# Patient Record
(demographics unavailable — no encounter records)

---

## 2024-11-03 NOTE — HISTORY OF PRESENT ILLNESS
[FreeTextEntry1] : Patient is a 69 yo M with CKD3, HTN, DM, who presents to followup  Current meds with him - atorvastatin 10mg, telmisartan/hctz 80-25mg, metoprolol 50, amlodipine 10, kerendia 20, jardiance 10mg   Reviewed labs in detail, hyponatremia worsened, described water intake down but food is stable  Ophthalmology - Renny Alicjaer [phone] fax [phone]; s/p cataract surgery bilaterally with recovery, eyes red Does admit has alcohol about 4 beers a day, resistant to stopping "what else can i drink" POCUS consistent with volume down, stable kidney exam with left small kidney stone.

## 2024-11-03 NOTE — HISTORY OF PRESENT ILLNESS
[FreeTextEntry1] : Patient is a 67 yo M with CKD3, HTN, DM, who presents to followup  Current meds with him - atorvastatin 10mg, telmisartan/hctz 80-25mg, metoprolol 50, amlodipine 10, kerendia 20, jardiance 10mg   Reviewed labs in detail, hyponatremia worsened, described water intake down but food is stable  Ophthalmology - Renny Alicjaer [phone] fax [phone]; s/p cataract surgery bilaterally with recovery, eyes red Does admit has alcohol about 4 beers a day, resistant to stopping "what else can i drink" POCUS consistent with volume down, stable kidney exam with left small kidney stone.

## 2024-11-03 NOTE — PHYSICAL EXAM
[General Appearance - Alert] : alert [General Appearance - In No Acute Distress] : in no acute distress [Outer Ear] : the ears and nose were normal in appearance [Oropharynx] : the oropharynx was normal [Neck Appearance] : the appearance of the neck was normal [Neck Cervical Mass (___cm)] : no neck mass was observed [Jugular Venous Distention Increased] : there was no jugular-venous distention [Respiration, Rhythm And Depth] : normal respiratory rhythm and effort [Exaggerated Use Of Accessory Muscles For Inspiration] : no accessory muscle use [Auscultation Breath Sounds / Voice Sounds] : lungs were clear to auscultation bilaterally [Heart Rate And Rhythm] : heart rate was normal and rhythm regular [Heart Sounds] : normal S1 and S2 [Heart Sounds Gallop] : no gallops [Murmurs] : no murmurs [Heart Sounds Pericardial Friction Rub] : no pericardial rub [Edema] : there was no peripheral edema [Veins - Varicosity Changes] : there were no varicosital changes [Bowel Sounds] : normal bowel sounds [Abdomen Soft] : soft [Abdomen Tenderness] : non-tender [Abdomen Mass (___ Cm)] : no abdominal mass palpated [No CVA Tenderness] : no ~M costovertebral angle tenderness [No Spinal Tenderness] : no spinal tenderness [Abnormal Walk] : normal gait [Involuntary Movements] : no involuntary movements were seen [Skin Color & Pigmentation] : normal skin color and pigmentation [Skin Turgor] : normal skin turgor [] : no rash [Cranial Nerves] : cranial nerves 2-12 were intact [No Focal Deficits] : no focal deficits [Affect] : the affect was normal [Mood] : the mood was normal [FreeTextEntry1] : s/p cataract surgery still red eyes

## 2024-11-03 NOTE — HISTORY OF PRESENT ILLNESS
[FreeTextEntry1] : Patient is a 67 yo M with CKD3, HTN, DM, who presents to followup  Current meds with him - atorvastatin 10mg, telmisartan/hctz 80-25mg, metoprolol 50, amlodipine 10, kerendia 20, jardiance 10mg   Reviewed labs in detail, hyponatremia worsened, described water intake down but food is stable  Ophthalmology - Renny Alicjaer [phone] fax [phone]; s/p cataract surgery bilaterally with recovery, eyes red Does admit has alcohol about 4 beers a day, resistant to stopping "what else can i drink" POCUS consistent with volume down, stable kidney exam with left small kidney stone.

## 2024-11-03 NOTE — ASSESSMENT
[FreeTextEntry1] : A Focused Ultrasound of the: [   ] Limited Chest [  X ] Limited Echo [ X  ] Limited Retroperitoneal [   ] Limited Abdominal [   ] Limited Vascular   Indication: Volume status, CKD, hyponatremia   Acquisition: PLAX, PSAX, Apical, subxiphoid views obtained. Bilateral long and short views of kidneys obtained The study was technically limited for the following reasons: None Findings: Bilateral increased kidney size with small stone on left, no hydronephrosis bilaterally. Heart LVH severe but normal EF, IVC flat   Interpretation: No progression of CKD but signs of chronic hypovolemia, small stone on left and flat ivc   The following follow-up studies are indicated: None   Archival System: Images were saved to [employer]

## 2024-11-03 NOTE — ASSESSMENT
[FreeTextEntry1] : A Focused Ultrasound of the: [   ] Limited Chest [  X ] Limited Echo [ X  ] Limited Retroperitoneal [   ] Limited Abdominal [   ] Limited Vascular   Indication: Volume status, CKD, hyponatremia   Acquisition: PLAX, PSAX, Apical, subxiphoid views obtained. Bilateral long and short views of kidneys obtained The study was technically limited for the following reasons: None Findings: Bilateral increased kidney size with small stone on left, no hydronephrosis bilaterally. Heart LVH severe but normal EF, IVC flat   Interpretation: No progression of CKD but signs of chronic hypovolemia, small stone on left and flat ivc   The following follow-up studies are indicated: None   Archival System: Images were saved to [employer]

## 2024-11-03 NOTE — ASSESSMENT
[FreeTextEntry1] : A Focused Ultrasound of the: [   ] Limited Chest [  X ] Limited Echo [ X  ] Limited Retroperitoneal [   ] Limited Abdominal [   ] Limited Vascular   Indication: Volume status, CKD, hyponatremia   Acquisition: PLAX, PSAX, Apical, subxiphoid views obtained. Bilateral long and short views of kidneys obtained The study was technically limited for the following reasons: None Findings: Bilateral increased kidney size with small stone on left, no hydronephrosis bilaterally. Heart LVH severe but normal EF, IVC flat   Interpretation: No progression of CKD but signs of chronic hypovolemia, small stone on left and flat ivc   The following follow-up studies are indicated: None   Archival System: Images were saved to [employer]

## 2025-01-15 NOTE — ASSESSMENT
[address] [facility]. 
[address] [facility] [address] [place], [address] 
[phone] GI CONSULTATION NOTE 
 
 
NAME:  Nanette Cooley III  
:   1951 MRN:   [id number] Consult Date: 2020 Chief Complaint: Anemia History of Present Illness:  Patient is a 71 y.o. who is seen in consultation at the request of Dr. Fadi Kumar for the above problem. Patient was admitted here on 20 with sepsis from osteomyelitis of the right foot. He had debridement yesterday and is currently getting antibiotics: Cefepime, Flagyl and Daptomycin. The operative note reports less than 100 ml of blood loss during surgery, but the nurse reports there was a lot of post-op bleeding with blood-soaked bandages needing to be changed frequently for several hours. Upon admission, his Hgb was 7.9, dropped as low as 6.5 yesterday and after transfusions improved to 7.8, but this morning is 6.9 again. He is currently receiving another unit of PRBCs. He states that he has been anemic for about a year or so, and reports having had an extensive GI evaluation at [facility] earlier this year (see summary below). He denies any recent or current nausea, vomiting, hematemesis, CGE, melena, hematochezia, diarrhea. He is feeling constipated and has not had a BM since admission, where he had been having a BM daily previously. He is getting pain medications at this time, and he states he does not want any laxatives as he fears incontinence since he is unable to get out of bed right now. We were able to retrieve [facility] records from earlier this year: - EGD 20 - erythema in the pre-pyloric stomach 
- Colonoscopy 20 - pan-diverticulosis, otherwise normal 
- Virtual colonoscopy 20 - normal GI  
- EGD 20 - clotted blood in the fundus, requiring copious lavage to dislodge the clot and net to remove it 
- Small bowel enteroscopy 20 - normal, no bleeding [FreeTextEntry1] : Patient is a 67 yo M with CKD3, HTN, DM, Hyponatremia, who presents to followup  Hyponatremia - likely 2/2 tea/toast and hypovolemia, will check hyponatremia labs today as below, enocuraged to increase protein intake for now, likely to need urena given acute on chronic state  CKD3 - likely 2/2 prior uncontrolled HTN and DM, now better controlled. HTN and DM disease management paramount, improved thus far, monitoring closely  HTN - Overtreated today, will stop amloipidne and hctz   DM - at goal  Acidosis - below goal, recheck labs today  MBD-CKD - Phos, PTH at goal recheck phos today  Anemia of CKD - Labs today.   RTC in 2 months to recheck BP - Capsule endoscopy 7/21/20 - BRB in the stomach with small erosions 
- EGD 7/23/20 - multiple non-bleeding angioectasias, treated with APCs PMH: 
Past Medical History:  
Diagnosis Date  Arthritis, Degenerative,  Knee 4/4/2011  Carcinoma, Prostate 4/4/2011  Degenerative disc disease 4/4/2011  Depression/ Anxiety 4/4/2011  Diabetes Mellitrus ( non-insulin dependent ) Type 2 4/4/2011  
 Heart failure ([address]  HEMATOCHEZIA 4/4/2011  Hypertrension 4/4/2011  Hypertriglyceridemia 4/4/2011  Ill-defined condition   
 lymphadema  Insomnia 4/4/2011  Laryngitis 4/4/2011  Mild Aortic insufficiency 4/4/2011  Mild Concentric LVH (left ventricular hypertrophy) 4/4/2011  Neuropathy 4/4/2011  Osteoarthritis 4/4/2011  Rotator Cuff Tendonitis 4/4/2011 PSH: 
Past Surgical History:  
Procedure Laterality Date  CARDIAC SURG PROCEDURE UNLIST    
 cardiac cath  COLONOSCOPY N/A 4/28/2017 COLONOSCOPY performed by Sue Masters MD at [facility] ENDOSCOPY  
 HX ORTHOPAEDIC    
 left hip pinning  HX OTHER SURGICAL    
 left little toe amputation  HX UROLOGICAL Allergies: 
No Known Allergies Home Medications: 
Prior to Admission Medications Prescriptions Last Dose Informant Patient Reported? Taking? Calcium Carbonate-Vit D3-Min 600 mg calcium- 400 unit tab 11/30/2020 at Unknown time  Yes Yes Sig: Take 1 Tab by mouth two (2) times a day. Insulin Needles, Disposable, (NOVOFINE 32) 32 gauge x 1/4\" ndle 11/30/2020 at Unknown time  No Yes Sig: Sig:use 4 times a day as needed Insulin Syringe-Needle U-100 (BD Insulin Syringe Ultra-Fine) 1 mL 31 gauge x 5/16 syrg 11/30/2020 at Unknown time  No Yes Sig: USE TO INJECT INSULIN FIVE TIMES A DAY NOVOLIN R REGULAR U-100 INSULN 100 unit/mL injection 11/30/2020 at Unknown time  No Yes Sig: INJECT 14 UNITS UNDER THE SKIN THREE TIMES A DAY WITH MEALS AS NEEDED  
 Thera-Tabs M 27 mg iron-400 mcg tab 11/30/2020 at Unknown time  No Yes Sig: TAKE ONE TABLET BY MOUTH DAILY  
acetaminophen (PAIN AND FEVER) 500 mg tablet 11/30/2020 at Unknown time  No Yes Sig: TAKE ONE TABLET BY MOUTH EVERY 6 HOURS AS NEEDED FOR PAIN  
amLODIPine (NORVASC) 10 mg tablet 11/30/2020 at Unknown time  No Yes Sig: TAKE ONE TABLET BY MOUTH DAILY  
ammonium lactate (AMLACTIN) 12 % topical cream 11/30/2020 at Unknown time  No Yes Sig: Apply  to affected area two (2) times a day. rub in to affected area well  
ammonium lactate (LAC-HYDRIN FIVE) 5 % lotion 11/30/2020 at Unknown time  No Yes Sig: Apply 1 Applicator to affected area daily. Apply daily bilateral lower legs  
ascorbic acid, vitamin C, (Vitamin C) 500 mg tablet 11/30/2020 at Unknown time  Yes Yes Sig: Take  by mouth. aspirin delayed-release 81 mg tablet 11/30/2020 at Unknown time  Yes Yes Sig: Take 81 mg by mouth daily. bicalutamide (CASODEX) 50 mg tablet 11/30/2020 at Unknown time  No Yes Sig: TAKE ONE TABLET BY MOUTH DAILY  
bisacodyl (DULCOLAX, BISACODYL,) 10 mg suppository 11/30/2020 at Unknown time  Yes Yes Sig: Insert 10 mg into rectum daily. diclofenac (VOLTAREN) 1 % gel 11/30/2020 at Unknown time  No Yes Sig: Apply  to affected area four (4) times daily. enzalutamide (XTANDI) 40 mg capsule 11/30/2020 at Unknown time  Yes Yes Sig: Take 160 mg by mouth daily. ferrous sulfate 325 mg (65 mg iron) tablet 11/30/2020 at Unknown time  No Yes Sig: TAKE ONE TABLET BY MOUTH DAILY BEFORE BREAKFAST  
fluticasone propionate (FLONASE) 50 mcg/actuation nasal spray 11/30/2020 at Unknown time  No Yes Sig: SPRAY TWO SPRAYS IN THE AFFECTED NOSTRIL DAILY  
gabapentin (NEURONTIN) 300 mg capsule 11/30/2020 at Unknown time  No Yes Sig: Take 1 Cap by mouth three (3) times daily. Max Daily Amount: 900 mg.  
hydrALAZINE (APRESOLINE) 100 mg tablet 11/30/2020 at Unknown time  No Yes Sig: Take 1 Tab by mouth three (3) times daily. hydrOXYzine HCL (ATARAX) 50 mg tablet 2020 at Unknown time  No Yes Sig: TAKE ONE TABLET BY MOUTH THREE TIMES A DAY AS NEEDED FOR ITCHING FOR UP TO 10 DAYS  
insulin aspart U-100 (NOVOLOG FLEXPEN U-100 INSULIN) 100 unit/mL (3 mL) inpn 2020 at Unknown time  Yes Yes Sig: by SubCUTAneous route. insulin glargine (LANTUS U-100 INSULIN) 100 unit/mL injection 2020 at Unknown time  No Yes Si Units by SubCUTAneous route daily. insulin regular (NOVOLIN R, HUMULIN R) 100 unit/mL injection 2020 at Unknown time  No Yes Si units sc tid  
ketoconazole (NIZORAL) 2 % shampoo 2020 at Unknown time  No Yes Sig: Sig:shampoo once a week  
ketoconazole (NIZORAL) 2 % topical cream 2020 at Unknown time  No Yes Sig: Apply  to affected area two (2) times a day. pantoprazole (PROTONIX) 40 mg tablet 2020 at Unknown time  No Yes Sig: TAKE ONE TABLET BY MOUTH TWICE DAILY ONCE IN THE MORNING AND ONCE IN THE EVENING  
pregabalin (LYRICA) 150 mg capsule 2020 at Unknown time  No Yes Sig: TAKE ONE CAPSULE BY MOUTH TWICE A DAY  
tamsulosin (FLOMAX) 0.4 mg capsule 2020 at Unknown time  No Yes Sig: TAKE ONE CAPSULE BY MOUTH DAILY  
torsemide (DEMADEX) 20 mg tablet 2020 at Unknown time  No Yes Sig: TAKE FOUR TABLETS BY MOUTH TWICE A DAY  
triamcinolone acetonide (KENALOG) 0.1 % ointment 2020 at Unknown time  No Yes Sig: APPLY A THIN LAYER TO AFFECTED AREA(S) TWO TIMES A DAY **DO NOT EXCEED 2.66 GRAMS PER DAY** Facility-Administered Medications: None Hospital Medications: 
Current Facility-Administered Medications Medication Dose Route Frequency  0.9% sodium chloride infusion 250 mL  250 mL IntraVENous PRN  
 cefepime (MAXIPIME) 2 g in 0.9% sodium chloride (MBP/ADV) 100 mL MBP  2 g IntraVENous Q24H  
 diclofenac (VOLTAREN) 1 % topical gel 2 g  2 g Topical QID  0.9% sodium chloride infusion 250 mL  250 mL IntraVENous PRN  
 0.9% sodium chloride infusion 250 mL  250 mL IntraVENous PRN  
 oxyCODONE-acetaminophen (PERCOCET) 5-325 mg per tablet 1 Tab  1 Tab Oral Q6H PRN  
 acetaminophen (TYLENOL) tablet 500 mg  500 mg Oral Q4H PRN  
 0.9% sodium chloride infusion  75 mL/hr IntraVENous CONTINUOUS  
 0.9% sodium chloride infusion 250 mL  250 mL IntraVENous PRN  
 metroNIDAZOLE (FLAGYL) IVPB premix 500 mg  500 mg IntraVENous Q12H  DAPTOmycin (CUBICIN) 750 mg in 0.9% sodium chloride 50 mL IVPB RF formulation  750 mg IntraVENous Q48H  
 amLODIPine (NORVASC) tablet 5 mg  5 mg Oral DAILY  aspirin delayed-release tablet 81 mg  81 mg Oral DAILY  bicalutamide (CASODEX) tablet 50 mg  50 mg Oral DAILY  enzalutamide [facility]) chemo capsule 160 mg (patient supplied) (Patient Supplied)  160 mg Oral DAILY AFTER BREAKFAST  gabapentin (NEURONTIN) capsule 100 mg  100 mg Oral TID  hydrALAZINE (APRESOLINE) tablet 100 mg  100 mg Oral TID  tamsulosin (FLOMAX) capsule 0.4 mg  0.4 mg Oral DAILY  nicotine (NICODERM CQ) 14 mg/24 hr patch 1 Patch  1 Patch TransDERmal DAILY  insulin lispro (HUMALOG) injection   SubCUTAneous AC&HS  
 glucose chewable tablet 16 g  4 Tab Oral PRN  
 dextrose (D50W) injection syrg 12.5-25 g  12.5-25 g IntraVENous PRN  
 glucagon (GLUCAGEN) injection 1 mg  1 mg IntraMUSCular PRN  
 [Held by provider] heparin (porcine) injection 5,000 Units  5,000 Units SubCUTAneous Q8H  
 insulin lispro (HUMALOG) injection 20 Units  20 Units SubCUTAneous TIDAC  insulin glargine (LANTUS) injection 50 Units  50 Units SubCUTAneous QHS Social History: 
Social History Tobacco Use  Smoking status: Current Every Day Smoker Packs/day: 1.00 Last attempt to quit: 2019 Years since quittin.1  Smokeless tobacco: Never Used Substance Use Topics  Alcohol use: Not Currently Alcohol/week: 11.7 standard drinks Types: 7 Cans of beer, 7 Shots of liquor per week Family History: 
Family History Family history unknown: Yes Review of Systems: 
 
Constitutional: negative fever, negative chills, negative weight loss Eyes:   negative visual changes ENT:   negative sore throat, tongue or lip swelling Respiratory:  negative cough, negative dyspnea Cards:  negative for chest pain, palpitations, lower extremity edema GI:   See HPI 
:  negative for frequency, dysuria Integument:  negative for rash and pruritus Heme:  negative for easy bruising and gum/nose bleeding Musculoskel: Right foot pain post debridement for osteomyelitis Neuro: negative for headaches, dizziness, vertigo Psych:  negative for feelings of anxiety, depression Objective:  
 
Patient Vitals for the past 8 hrs: 
 BP Temp Pulse Resp SpO2 Weight 12/31/20 1035 (!) 155/62 98.6 °F (37 °C) 81 17 95 %   
12/31/20 1005 (!) 154/63 98.8 °F (37.1 °C) 85 18 94 %   
12/31/20 0950 (!) 151/65 98.8 °F (37.1 °C) 84 17 94 %   
12/31/20 0935 (!) 140/63 98.7 °F (37.1 °C) 86 19 93 %   
12/31/20 0906      114.1 kg (251 lb 8.7 oz) 12/31 0701 - 12/31 1900 In: 480 [P.O.:480] Out: -  
12/29 1901 - 12/31 0700 In: 1955.1 [P.O.:720; I.V.:150] Out: 1405 [[id number]] PHYSICAL EXAM: 
General appearance: cooperative, no distress, appears stated age Skin: Extremities and face reveal no rashes, jaundice or pallor HEENT: Sclerae anicteric. Extra-occular muscles are intact. Cardiovascular: Regular rate and rhythm. No murmurs, gallops, or rubs. Respiratory: Clear breath sounds with no wheezes, rales, or rhonchi. GI: Abdomen nondistended, soft, and nontender. Normal active bowel sounds. No enlargement of the liver or spleen. No masses palpable. Rectal: Deferred Musculoskeletal: No edema of the lower legs. Bandaged right foot Neurological: Gross memory appears intact. Patient is alert and oriented. Psychiatric: Mood appears appropriate with good judgement. No anxiety or agitation. Data Review Recent Labs 12/31/20 
9345 12/30/20 
2153 WBC 12.7* 13.9* HGB 6.9* 7.8* HCT 20.9* 23.4*  
 322 Recent Labs 12/31/20 
7536 12/30/20 
0117 12/29/20 
0404 * 130* 129*  
K 4.4 3.9 3.9  100 97 CO2 20* 21 22 BUN 59* 63* 60* CREA 3.75* 4.37* 3.84* * 190* 203* PHOS  --  3.9 3.5 CA 7.7* 7.9* 8.5 Recent Labs  
  12/29/20 
0404 12/28/20 
1541 AP  --  162* TP  --  8.3* ALB 1.6* 1.8*  
GLOB  --  6.5* No results for input(s): INR, PTP, APTT, INREXT in the last 72 hours. Imaging studies reviewed Assessment / Plan :  
 
Anemia / GIB 
- Extensive w/u at [address] [place] [address] with evidence of gastric angioectasias treated with APC 
- Plan for EGD 1/4/21 with Dr. Queen Bolds Osteomyelitis right foot Sepsis - Ortho and ID following KATHI on CKD 
- Nephrology following COVID-negative 12/29/20 Previous GI w/u at [facility]: 
- EGD 6/1/20 - erythema in the pre-pyloric stomach 
- Colonoscopy 6/1/20 - pan-diverticulosis, otherwise normal 
- Virtual colonoscopy 6/2/20 - normal GI  
- EGD 6/22/20 - clotted blood in the fundus, requiring copious lavage to dislodge the clot and net to remove it 
- Small bowel enteroscopy 7/20/20 - normal, no bleeding - Capsule endoscopy 7/21/20 - BRB in the stomach with small erosions 
- EGD 7/23/20 - multiple non-bleeding angioectasias, treated with APCs Patient Active Hospital Problem List:  
Principal Problem: 
  Osteomyelitis ([facility] [address] (12/28/2020)

## 2025-01-15 NOTE — END OF VISIT
Yi Mari is a 43 year old female presents for a preop exam for hysterectomy, bilateral salpingectomy for heavy irregular bleeding status post negative workup.  Social History     Tobacco Use   • Smoking status: Former Smoker     Packs/day: 0.50     Years: 23.00     Pack years: 11.50     Last attempt to quit: 2010     Years since quittin.6   • Smokeless tobacco: Never Used   Substance Use Topics   • Alcohol use: No     Alcohol/week: 0.0 standard drinks   • Drug use: No     Types: Marijuana     Comment: once in 2015     Social History     Tobacco Use   Smoking Status Former Smoker   • Packs/day: 0.50   • Years: 23.00   • Pack years: 11.50   • Last attempt to quit: 2010   • Years since quittin.6   Smokeless Tobacco Never Used     Social History     Substance and Sexual Activity   Alcohol Use No   • Alcohol/week: 0.0 standard drinks     Past Medical History:   Diagnosis Date   • Abnormal uterine bleeding 2018 wgm--for the last 1-2 years.  Normally every 4w or so, always been normal, then started getting heavy. Lasted 5 d.  For the last 1-2 years.  It has been worse, bleeding a lot, cramping a lot, lasting >5d.  If it stops, then it spots again 2d later. Lasts 5-7d for the last year.  Pain 6/10, doesn't take medicine for it.  Bleeds a lot more with midol, and tylenol.  H/o Essure  with Dr. Galindo   • Allergy    • Driver's esophagus 2018   • Bile salt-induced diarrhea 3/20/2019   • Carpal tunnel syndrome of right wrist 2017   • Compression of right radial nerve 2017   • Crohn's disease (CMS/HCC)    • DDD (degenerative disc disease), lumbar    • Deep peroneal nerve entrapment at the left dorsal foot 5/15/2017   • Duodenal diverticulum    • Erosive esophagitis     hx   • Essential (primary) hypertension    • Failed moderate sedation during procedure     woke up with sedation from egd   • Generalized anxiety disorder 2015   • GERD (gastroesophageal  [Time Spent: ___ minutes] : I have spent [unfilled] minutes of time on the encounter which excludes teaching and separately reported services. reflux disease)     w/ Driver's esophagus   • Hiatal hernia    • History of renal calculi 6/19/2019   • Hypersomnia 6/19/2019    Per sleep medicine; remote sleep study Possible narcolepsy   • Lateral epicondylitis of right elbow 5/11/2017   • Left lumbar radiculitis 4/19/2017   • Left superficial peroneal nerve entrapment syndrome at leg level 5/15/2017   • Lumbar facet arthropathy 6/19/2019    2017 MRI   • Median superior cluneal nerve entrapment syndrome 5/15/2017   • Migraine headache    • Morbid obesity with BMI of 40.0-44.9, adult (CMS/[facility])    • Muscle pain, fibromyalgia 6/2016    WPI = 10; SS score = 10   • Neurocardiogenic syncope 2016    Seen by EP   • Piriformis syndrome 5/15/2017   • Recurrent major depressive disorder, in partial remission (CMS/[facility]) 6/24/2013   • RLS (restless legs syndrome) 5/17/2016   • Sciatic pain 5/15/2017   • Sleep apnea     unsure of type    • Tonsil stone 11/7/2018   • Undiagnosed cardiac murmurs 4/2016     Past Surgical History:   Procedure Laterality Date   • Cholecystectomy     • Colonoscopy  05/26/2011   • Colonoscopy w/ biopsies  04/10/2019   • Ercp,sphincterotomy  10/10/2011    pancreatic ultrasound with sphincterotomy and stenting of the pancreatic duct   • Esophagogastroduodenoscopy  4/15/2015    DUE 2018   • Esophagogastroduodenoscopy  05/30/2018    +Barretts, repeat in 3 yrs   • Essure  2004 OR 05   • Hb lithotripsy  2005   • Paravertebral facet inj jnt lumbar sacral 1 Bilateral 8/2/2019    BLOCK, NERVE, SPINAL, LUMBOSACRAL, POSTERIOR RAMUS, MEDIAL BRANCH, BILATERAL, 1 LEVEL  BILATAERAL L3-4-5 Medial Branch block performed by Soheila Vergara MD at [facility] PA[facility]   • Zenda tooth extraction       Outpatient Medications Marked as Taking for the 10/7/19 encounter (Office Visit) with Ozzy Molina MD   Medication Sig Dispense Refill   • MISC NATURAL PRODUCTS PO Equate HA medication     • lisdexamfetamine (VYVANSE) 70 MG capsule Take 1 capsule by mouth every morning. 30 capsule  0   • medroxyPROGESTERone (PROVERA) 10 MG tablet Take 1 tablet by mouth daily. 7 tablet 0   • oxyCODONE-acetaminophen (PERCOCET) 5-325 MG per tablet Take 1-2 tablets by mouth every 6 hours as needed for Pain. 24 tablet 0   • cloNIDine (CATAPRES) 0.1 MG tablet Take 1 tablet by mouth 2 times daily. 180 tablet 1   • amitriptyline (ELAVIL) 25 MG tablet Take 1 tablet by mouth nightly. 30 tablet 6   • Vitamin D, Ergocalciferol, 60437 units capsule Take 1 capsule by mouth 1 day a week. 12 capsule 3   • cyclobenzaprine (FLEXERIL) 10 MG tablet Take 1 tablet by mouth 3 times daily as needed for Muscle spasms. 15 tablet 0   • MAGNESIUM OXIDE PO Take by mouth daily.     • rabeprazole (ACIPHEX) 20 MG tablet Take 1 tablet by mouth 2 times daily. 60 tablet 5   • ranitidine (ZANTAC) 300 MG tablet TAKE 1 TABLET BY MOUTH IN THE EVENING 30 tablet 11   • clonazePAM (KLONOPIN) 0.5 MG tablet Take 1 tablet by mouth 3 times daily as needed for Anxiety. 90 tablet 5   • loratadine (CLARITIN) 10 MG tablet Take 1 tablet PO daily 90 tablet 1   • fluticasone (FLONASE) 50 MCG/ACT nasal spray Spray 1 spray in each nostril 2 times daily. 1 Bottle 6   • cholecalciferol (VITAMIN D3) 1000 units tablet Take 1,000 Units by mouth daily.     • albuterol (PROAIR HFA) 108 (90 Base) MCG/ACT inhaler Inhale 2 puffs into the lungs every 4 hours as needed for Shortness of Breath or Wheezing. 9 g 5     ALLERGIES:   Allergen Reactions   • Vicodin [Hydrocodone-Acetaminophen] SHORTNESS OF BREATH   • Abilify Other (See Comments)     Feeling foggy/tired/mumbling words   • Adderall Other (See Comments)     Foggy mind   • Gabapentin HEADACHES   • Tramadol HEADACHES     Visit Vitals  BP (!) 146/110   Pulse 88   Ht 5' 2\" (1.575 m)   Wt 115.2 kg   LMP 08/30/2019 (Approximate)   BMI 46.46 kg/m²     GENERAL: Alert,Oriented,in no acute distress.  HEENT: Eyes: Pupils equal, round reactive to light, clear sclera. Ears,nose and throat normal. Pharynx normal.  NECK:  Normal on  inspection and palpation.  RESPIRATORY: Easy respirations, clear to auscultation bilaterally.  BACK: No CVAT.  CARDIOVASCULAR: tachycardia ** 130 bpm.  heart regular  rhythm, without any murmurs, gallops or clicks.  ABDOMEN: Soft,nontender.  No organomegaly, distention or herniation.  Bowel sounds normal.  EXTREMITIES:  No calf tenderness. No clubbing, cyanosis or edema. Range of motion normal.  SKIN: Normal.  NEUROLOGICAL: 2+DTR's.  HG strength 5/5  PSYCH:  Normal mood and normal affect.  Pelvic exam deferred to the OR    ROS:  Constitutional: Negative for fever and chills.   Skin: Negative for rash.   HEENT: Negative for eye drainage, ear pain, sore throat.  Respiratory: Negative cough, wheezing or shortness of breath.    Cardiovascular: Negative for chest pain or chest pressure.   Gastrointestinal: negative GI  Genitourinary: negative  Extremities:  Negative for joint swelling or joint pain.  Endocrine: Negative for heat or cold intolerance.  Psych: Negative for change in mood or mentation.    Patient Active Problem List    Diagnosis Date Noted   • Preop examination 10/07/2019     Priority: Low     Overview Note:     10/19 wgm-- informed consent obtained for robotic hysterectomy, bilateral salpingectomy. Hypertension, tachycardia 130 bpm     • Tachycardia 10/07/2019     Priority: Low     Overview Note:     10/19 wgm-- preop visit     • Menorrhagia 09/23/2019     Priority: Low     Overview Note:     9/19 wgm--for hysterectomy, bilateral salpingectomy  9/19 wgm-- hypertensive, over 34yo.  patient seen in follow-up from emergency department for another episode of heavy bleeding. Chart reviewed. Normal labs, normal ultrasound, normal endometrial biopsy. Patient has essure devices within. Therefore no D&C. Patient declines endometrial ablation, and I agree including due to the Essure devices in place. Pt declines Mirena or progesterone only attempt at controlling period.  Plan definitive treatment with hysterectomy.  Short-term attempt at bleeding control with progesterone 10 mg p.o. daily for 7 days. Schedule hysterectomy.  10/19 wgm-- preoperative appointment today. Chart reviewed. Informed consent obtained. Robotic hysterectomy bilateral salpingectomy. Tachycardia, to see presurgical optimization clinic versus primary care provider. 130bpm     • Carpal tunnel syndrome of right wrist 07/10/2019     Priority: Low   • Idiopathic peripheral neuropathy 07/10/2019     Priority: Low   • Hypersomnia 06/19/2019     Priority: Low     Overview Note:     Per sleep medicine; remote sleep study  Possible narcolepsy     • History of renal calculi 06/19/2019     Priority: Low   • Lumbar facet arthropathy 06/19/2019     Priority: Low     Overview Note:     2017 MRI     • Urge incontinence of urine 12/17/2018     Priority: Low     Overview Note:     12/18 wgm--pt describes being dry, even with cough/sneeze/exercise.  But short \"warning time\" between urge to go to bathroom and beginning of urination.  Trial of medication desired, over urodynamic testing.     • Lumbar degenerative disc disease      Priority: Low     Overview Note:     2017 MRI: Relatively mild multilevel degenerative change, most prominent at L4-L5. Multi level facet arthropathy     • Essential (primary) hypertension      Priority: Low     Overview Note:     Patient is on unopposed Clonidine 0.2 twice daily.  This is unusual.  Will likely transition using Atenolol and possibly to a CCB or ARB.     • IFG (impaired fasting glucose) 08/18/2017     Priority: Low     Overview Note:     A1C 5.7%     • Medication management 08/18/2017     Priority: Low   • Dysmenorrhea 07/19/2017     Priority: Low     Overview Note:     12/18 wgm--see abnormal bleeding notes.       • Generalized OA 12/23/2016     Priority: Low   • History of syncope 10/25/2016     Priority: Low     Overview Note:     Improves with hydration; holter unremarkable; EP evaluation     • Muscle pain, fibromyalgia 06/01/2016      Priority: Low     Overview Note:     WPI = 10; SS score = 10     • RLS (restless legs syndrome) 05/17/2016     Priority: Low   • Vitamin D deficiency 03/15/2016     Priority: Low   • Morbid obesity with BMI of 40.0-44.9, adult (CMS/[facility]) 02/02/2016     Priority: Low   • Environmental allergies 09/23/2015     Priority: Low   • ADHD (attention deficit hyperactivity disorder), inattentive type 08/06/2015     Priority: Low     Overview Note:     + Neuropsychology testing 2013.  She is currently taking Vyvanse 70 mg.     • Chronic tension-type headache, intractable 08/06/2015     Priority: Low     Overview Note:     Neurology  MRI brain 2015 unremarkable, except sinusitis  MRI brain 2011 unremarkable     • Generalized anxiety disorder 01/29/2015     Priority: Low     Overview Note:     Currently taking only Clonapin.  Cymbalta and or Amitriptyline would be good choices to reduce need for benzodiazepines.     • Recurrent major depressive disorder, in partial remission (CMS/[facility]) 06/24/2013     Priority: Low     Overview Note:     Past medications: Wellbutrin, Effexor, Abilify, Citalopram, Lexapro  Referral to behavioral health 2015     • GERD (gastroesophageal reflux disease) 01/09/2013     Priority: Low       Menorrhagia with irregular cycle  (primary encounter diagnosis)  Comment:   Plan:     Preop examination  Comment:   Plan:     Tachycardia  Comment:  Pulse was 88 per nursing exam. Pulse was 130 bpm when I examined her.  Plan: Presurgical optimization clinic versus evaluation per Dr. Umana, patient's primary care provider.    INFORMED CONSENT--robotic-assisted total vaginal hysterectomy, bilateral salpingectomy, or irregular heavy bleeding status post negative workup. Patient desires definitive therapy.  The risks, benefits and alternatives of the procedure were discussed with the patient, including the risks of bleeding, infection, injury to bowel, bladder or other organs requiring further surgery, as well as  thromboembolic and anesthetic complications.  We also discussed the risk of regret of having the procedure done.  She understands and agrees with the plan.    See problem notes above for details

## 2025-01-15 NOTE — HISTORY OF PRESENT ILLNESS
[FreeTextEntry1] : Patient is a 67 yo M with CKD3, HTN, DM, who presents to followup  Current meds with him - atorvastatin 10mg, telmisartan/hctz 80-25mg, metoprolol 50, amlodipine 10, kerendia 20, jardiance 10mg, urena 15g per day   Ophthalmology - Renny Steen [phone] fax [phone]; s/p cataract surgery bilaterally with recovery, eyes red; reviewed recent assessment diabetic retinopathy still worsened from last year, next check in 6 months Does admit has alcohol about 4 beers a day, still resistant to stopping  BPs lower - will stop HCTZ and amlodipine

## 2025-03-14 NOTE — HISTORY OF PRESENT ILLNESS
[FreeTextEntry1] : Patient is a 69 yo M with CKD3, HTN, DM, who presents to followup  Current meds with him - atorvastatin 10mg, telmisartan 80, metoprolol 50 BID, amlodipine 10, kerendia 20, jardiance 10mg, urena 15g per day  off of hctz  Ophthalmology - Standish Enio [phone] fax [phone]; s/p cataract surgery bilaterally with recovery, eyes red; reviewed recent assessment diabetic retinopathy still worsened from last year, will need surgery it seems Does admit has alcohol about 4 beers a day, still resistant to stopping  Hydration - doing well, sticking with water.

## 2025-03-14 NOTE — ASSESSMENT
[FreeTextEntry1] : Patient is a 69 yo M with CKD3, HTN, DM, Hyponatremia, who presents to followup  Hyponatremia - likely 2/2 tea/toast and hypovolemia, exacerbated by hctz, now off for a couple of months still on urena will recheck labs and if stable may try to stop urena  CKD3 - likely 2/2 prior uncontrolled HTN and DM, now better controlled. HTN and DM disease management paramount, improved thus far, monitoring closely  HTN - At goal on just telmisartan  DM - at goal  Acidosis - below goal, recheck labs today  MBD-CKD - Phos, PTH at goal  Anemia of CKD - at goal hgb no deficiency in iron   RTC in 3-4 months to recheck BP and hyponatremia labs, will call to stop urena if stable

## 2025-03-14 NOTE — HISTORY OF PRESENT ILLNESS
[FreeTextEntry1] : Patient is a 67 yo M with CKD3, HTN, DM, who presents to followup  Current meds with him - atorvastatin 10mg, telmisartan 80, metoprolol 50 BID, amlodipine 10, kerendia 20, jardiance 10mg, urena 15g per day  off of hctz  Ophthalmology - [place] Enio [phone] fax [phone]; s/p cataract surgery bilaterally with recovery, eyes red; reviewed recent assessment diabetic retinopathy still worsened from last year, will need surgery it seems Does admit has alcohol about 4 beers a day, still resistant to stopping  Hydration - doing well, sticking with water.

## 2025-07-30 NOTE — HISTORY OF PRESENT ILLNESS
[FreeTextEntry1] : Patient is a 67 yo M with CKD3, HTN, DM, who presents to followup  Current meds with him - atorvastatin 10mg, telmisartan 80, metoprolol 50 BID, amlodipine 10, kerendia 20, jardiance 10mg  Ophthalmology - Renny Steen [phone] fax [phone]; s/p cataract surgery bilaterally with recovery, eyes red; reviewed recent assessment diabetic retinopathy still worsened from last year, will need surgery it seems Does admit has alcohol about 4 beers a day, still resistant to stopping  Hydration - doing well, sticking with water

## 2025-07-30 NOTE — HISTORY OF PRESENT ILLNESS
[FreeTextEntry1] : Patient is a 69 yo M with CKD3, HTN, DM, who presents to followup  Current meds with him - atorvastatin 10mg, telmisartan 80, metoprolol 50 BID, amlodipine 10, kerendia 20, jardiance 10mg  Ophthalmology - Renny Steen [phone] fax [phone]; s/p cataract surgery bilaterally with recovery, eyes red; reviewed recent assessment diabetic retinopathy still worsened from last year, will need surgery it seems Does admit has alcohol about 4 beers a day, still resistant to stopping  Hydration - doing well, sticking with water

## 2025-07-30 NOTE — REASON FOR VISIT
Due to increased pain in the setting of trauma and very minor OA on XR will order CT scan to rule out occult proximal femoral fracture  Remain NWB until we are able to call with results  [Follow-Up] : a follow-up visit